# Patient Record
Sex: MALE | Race: WHITE | URBAN - METROPOLITAN AREA
[De-identification: names, ages, dates, MRNs, and addresses within clinical notes are randomized per-mention and may not be internally consistent; named-entity substitution may affect disease eponyms.]

---

## 2023-02-15 ENCOUNTER — OFFICE VISIT (OUTPATIENT)
Dept: URBAN - METROPOLITAN AREA CLINIC 98 | Facility: CLINIC | Age: 53
End: 2023-02-15
Payer: COMMERCIAL

## 2023-02-15 ENCOUNTER — DASHBOARD ENCOUNTERS (OUTPATIENT)
Age: 53
End: 2023-02-15

## 2023-02-15 ENCOUNTER — WEB ENCOUNTER (OUTPATIENT)
Dept: URBAN - METROPOLITAN AREA CLINIC 98 | Facility: CLINIC | Age: 53
End: 2023-02-15

## 2023-02-15 VITALS
SYSTOLIC BLOOD PRESSURE: 122 MMHG | HEART RATE: 75 BPM | HEIGHT: 72 IN | TEMPERATURE: 97.1 F | BODY MASS INDEX: 34.27 KG/M2 | DIASTOLIC BLOOD PRESSURE: 76 MMHG | WEIGHT: 253 LBS

## 2023-02-15 DIAGNOSIS — R12 HEARTBURN: ICD-10-CM

## 2023-02-15 DIAGNOSIS — R10.11 RIGHT UPPER QUADRANT ABDOMINAL PAIN: ICD-10-CM

## 2023-02-15 PROCEDURE — 99204 OFFICE O/P NEW MOD 45 MIN: CPT | Performed by: INTERNAL MEDICINE

## 2023-02-15 PROCEDURE — 99244 OFF/OP CNSLTJ NEW/EST MOD 40: CPT | Performed by: INTERNAL MEDICINE

## 2023-02-15 NOTE — HPI-TODAY'S VISIT:
Mr. Farrell is a 53 yo M presenting for consultation for RUQ abdominal pain and is referred by Dr. Jerrell Zamora. A copy of the report will be sent to Dr. Zamora.   6 days ago he developed a sharp RUQ abdominal pain.  Stabbing pain, worse with movement, moderate to severe in intensity. Pain occurred after eating a sandwich very quickly. Pain is 50% improved today. He has a BM daily, no change. No blood in the stool.  Last cologuard test normal 1-2 years ago.  No family history of colon cancer.  No weight loss, vomiting.  He has heartbun and takes Pepcid twice per week at most, works well for him. Seems to be diet related.  I reviewed his labs:  2/12/23 ALT 40, AST 32 T bili 0.7

## 2023-02-15 NOTE — EXAM-FUNCTIONAL ASSESSMENT
Constitutional: well-developed, normal communication ability.   Eyes: Conjunctivae and eyelids appear normal, no scleral icterus. Respiratory: symmetric expansion of chest wall, normal work of breathing   Gastrointestinal:  normoactive bowel sounds, soft, right upper quadrant tenderness, no rebound tenderness, no shifting dullness, no organomegaly.   Musculoskeletal: normal gait and station   Skin: no jaundice   Neurologic: Oriented to person, place, time. Short term memory intact.    Psychiatric: Normal mood and appropriate affect.

## 2023-02-16 LAB
BASO (ABSOLUTE): 0.1
BASOS: 1
EOS (ABSOLUTE): 0.2
EOS: 3
HEMATOCRIT: 44.6
HEMATOLOGY COMMENTS:: (no result)
HEMOGLOBIN: 16.1
IMMATURE CELLS: (no result)
IMMATURE GRANS (ABS): 0
IMMATURE GRANULOCYTES: 0
LYMPHS (ABSOLUTE): 2
LYMPHS: 35
MCH: 33.8
MCHC: 36.1
MCV: 94
MONOCYTES(ABSOLUTE): 0.5
MONOCYTES: 9
NEUTROPHILS (ABSOLUTE): 2.9
NEUTROPHILS: 52
NRBC: (no result)
PLATELETS: 202
RBC: 4.77
RDW: 11.9
WBC: 5.6

## 2023-02-18 ENCOUNTER — WEB ENCOUNTER (OUTPATIENT)
Dept: URBAN - METROPOLITAN AREA CLINIC 98 | Facility: CLINIC | Age: 53
End: 2023-02-18

## 2023-03-07 ENCOUNTER — OFFICE VISIT (OUTPATIENT)
Dept: URBAN - METROPOLITAN AREA CLINIC 97 | Facility: CLINIC | Age: 53
End: 2023-03-07

## 2023-03-16 ENCOUNTER — OFFICE VISIT (OUTPATIENT)
Dept: URBAN - METROPOLITAN AREA TELEHEALTH 2 | Facility: TELEHEALTH | Age: 53
End: 2023-03-16